# Patient Record
Sex: MALE | Race: AMERICAN INDIAN OR ALASKA NATIVE | URBAN - METROPOLITAN AREA
[De-identification: names, ages, dates, MRNs, and addresses within clinical notes are randomized per-mention and may not be internally consistent; named-entity substitution may affect disease eponyms.]

---

## 2024-11-14 ENCOUNTER — LAB OUTSIDE AN ENCOUNTER (OUTPATIENT)
Dept: URBAN - METROPOLITAN AREA CLINIC 40 | Facility: CLINIC | Age: 29
End: 2024-11-14

## 2024-11-14 ENCOUNTER — DASHBOARD ENCOUNTERS (OUTPATIENT)
Age: 29
End: 2024-11-14

## 2024-11-14 ENCOUNTER — OFFICE VISIT (OUTPATIENT)
Dept: URBAN - METROPOLITAN AREA CLINIC 40 | Facility: CLINIC | Age: 29
End: 2024-11-14
Payer: COMMERCIAL

## 2024-11-14 VITALS
TEMPERATURE: 98.1 F | BODY MASS INDEX: 35.96 KG/M2 | HEART RATE: 67 BPM | SYSTOLIC BLOOD PRESSURE: 132 MMHG | WEIGHT: 251.2 LBS | HEIGHT: 70 IN | DIASTOLIC BLOOD PRESSURE: 88 MMHG

## 2024-11-14 DIAGNOSIS — K76.0 FATTY LIVER: ICD-10-CM

## 2024-11-14 DIAGNOSIS — R79.89 ABNORMAL LIVER FUNCTION TEST: ICD-10-CM

## 2024-11-14 PROCEDURE — 99204 OFFICE O/P NEW MOD 45 MIN: CPT | Performed by: INTERNAL MEDICINE

## 2024-11-14 RX ORDER — ALBUTEROL SULFATE 90 UG/1
1 PUFF AS NEEDED AEROSOL, METERED RESPIRATORY (INHALATION)
Status: ACTIVE | COMMUNITY

## 2024-11-14 RX ORDER — LISINOPRIL 20 MG/1
1 TABLET TABLET ORAL ONCE A DAY
Status: ACTIVE | COMMUNITY

## 2024-11-14 RX ORDER — AMLODIPINE BESYLATE 10 MG/1
1 TABLET TABLET ORAL ONCE A DAY
Status: ACTIVE | COMMUNITY

## 2024-11-14 NOTE — HPI-TODAY'S VISIT:
Patient is being referred for evaluation for abnormal liver tests he does have a history of fatty liver.  He underwent a liver ultrasound in 2016 showing fatty liver and hepatomegaly he underwent follow-up ultrasound 2017 again showing fatty liver lab work at that time revealed   recent lab work from his primary care physician now reveals  AST 59 Patient presents for evaluation 11/14/2024.  He is being referred for evaluation Of his history of fatty liver.  Reviewing previous records he underwent a liver ultrasound in 2016 and again in 2017 showing fatty liver.  Recent lab work does show elevated transaminases with ALT of 100 AST 59.  Overall he is feeling well from a GI standpoint.  He has no significant heartburn or reflux, denies abdominal pain his bowel movements are essentially normal, although he will experience the urge to defecate shortly after eating.  He does drink a good amount of caffeine, especially Coca-[employer]a.  He does not drink alcohol, has had no exposure to hepatitis.  His primary care physician has assessed him as being prediabetic, and he is being treated for hypertension.

## 2024-11-15 LAB
ABSOLUTE BASOPHILS: 71
ABSOLUTE EOSINOPHILS: 128
ABSOLUTE LYMPHOCYTES: 2244
ABSOLUTE MONOCYTES: 625
ABSOLUTE NEUTROPHILS: 4033
ALBUMIN/GLOBULIN RATIO: 1
ALBUMIN: 4
ALKALINE PHOSPHATASE: 91
ALT: 85
AST: 63
BASOPHILS: 1
BILIRUBIN, TOTAL: 0.7
BUN/CREATININE RATIO: (no result)
CALCIUM: 9.1
CARBON DIOXIDE: 25
CHLORIDE: 104
CREATININE: 0.79
EGFR: 124
EOSINOPHILS: 1.8
FIB 4 INDEX: 0.91
FIB 4 INTERPRETATION: (no result)
GLOBULIN: 4.1
GLUCOSE: 91
HBSAG SCREEN: (no result)
HEMATOCRIT: 44.9
HEMOGLOBIN: 15
HEP A AB, IGM: (no result)
HEP B CORE AB, IGM: (no result)
HEPATITIS C ANTIBODY: (no result)
LYMPHOCYTES: 31.6
MCH: 27.6
MCHC: 33.4
MCV: 82.7
MONOCYTES: 8.8
MPV: 10.6
NEUTROPHILS: 56.8
PLATELET COUNT: 211
PLATELET COUNT: 211
POTASSIUM: 4
PROTEIN, TOTAL: 8.1
RDW: 13.8
RED BLOOD CELL COUNT: 5.43
SODIUM: 137
UREA NITROGEN (BUN): 11
WHITE BLOOD CELL COUNT: 7.1

## 2024-12-04 ENCOUNTER — OFFICE VISIT (OUTPATIENT)
Dept: URBAN - METROPOLITAN AREA CLINIC 39 | Facility: CLINIC | Age: 29
End: 2024-12-04
Payer: COMMERCIAL

## 2024-12-04 DIAGNOSIS — K76.0 FATTY (CHANGE OF) LIVER: ICD-10-CM

## 2024-12-04 PROCEDURE — 76705 ECHO EXAM OF ABDOMEN: CPT | Performed by: INTERNAL MEDICINE

## 2025-01-16 ENCOUNTER — OFFICE VISIT (OUTPATIENT)
Dept: URBAN - METROPOLITAN AREA CLINIC 40 | Facility: CLINIC | Age: 30
End: 2025-01-16
Payer: COMMERCIAL

## 2025-01-16 VITALS
TEMPERATURE: 97 F | BODY MASS INDEX: 36.05 KG/M2 | WEIGHT: 251.8 LBS | HEART RATE: 72 BPM | HEIGHT: 70 IN | SYSTOLIC BLOOD PRESSURE: 130 MMHG | DIASTOLIC BLOOD PRESSURE: 80 MMHG

## 2025-01-16 DIAGNOSIS — R79.89 ABNORMAL LIVER FUNCTION TEST: ICD-10-CM

## 2025-01-16 DIAGNOSIS — K76.0 FATTY LIVER: ICD-10-CM

## 2025-01-16 PROCEDURE — 99214 OFFICE O/P EST MOD 30 MIN: CPT | Performed by: INTERNAL MEDICINE

## 2025-01-16 RX ORDER — LISINOPRIL 20 MG/1
1 TABLET TABLET ORAL ONCE A DAY
Status: ACTIVE | COMMUNITY

## 2025-01-16 RX ORDER — AMLODIPINE BESYLATE 10 MG/1
1 TABLET TABLET ORAL ONCE A DAY
Status: ACTIVE | COMMUNITY

## 2025-01-16 RX ORDER — ALBUTEROL SULFATE 90 UG/1
1 PUFF AS NEEDED AEROSOL, METERED RESPIRATORY (INHALATION)
Status: ACTIVE | COMMUNITY

## 2025-01-16 NOTE — HPI-TODAY'S VISIT:
Patient is being referred for evaluation for abnormal liver tests he does have a history of fatty liver.  He underwent a liver ultrasound in 2016 showing fatty liver and hepatomegaly he underwent follow-up ultrasound 2017 again showing fatty liver lab work at that time revealed   recent lab work from his primary care physician now reveals  AST 59 Patient presents for evaluation 11/14/2024.  He is being referred for evaluation Of his history of fatty liver.  Reviewing previous records he underwent a liver ultrasound in 2016 and again in 2017 showing fatty liver.  Recent lab work does show elevated transaminases with ALT of 100 AST 59.  Overall he is feeling well from a GI standpoint.  He has no significant heartburn or reflux, denies abdominal pain his bowel movements are essentially normal, although he will experience the urge to defecate shortly after eating.  He does drink a good amount of caffeine, especially Coca-[employer]a.  He does not drink alcohol, has had no exposure to hepatitis.  His primary care physician has assessed him as being prediabetic, and he is being treated for hypertension. Patient underwent lab work on 11/14/2024 which included a normal CBC and negative hepatitis A, B, and C panel fib 4 index is 0.91 consistent with the absence of advanced liver fibrosis and his CMP with AST 63 ALT 85 alkaline phosphatase 91 bilirubin 0.7 Patient returns for follow-up on 1/16/2025.  I reviewed the results of his recent lab work which included a normal CBC and negative hepatitis A, B, and C panel and the fib 4 index of 0.91 which is consistent with the absence of advanced liver fibrosis.  His CMP did show elevated transaminases with AST 63 ALT 85.  Since his last visit he has not lost weight, which he attributes to the holidays I reviewed the results of his recent right upper quadrant ultrasound which showed fatty liver but was otherwise unremarkable patient states that he is feeling well he has no significant reflux or swallowing issues, denies abdominal pain his bowel movements have been normal.  He has been diagnosed as prediabetic by his primary care doctor.

## 2025-04-17 ENCOUNTER — OFFICE VISIT (OUTPATIENT)
Dept: URBAN - METROPOLITAN AREA CLINIC 40 | Facility: CLINIC | Age: 30
End: 2025-04-17

## 2025-04-17 RX ORDER — AMLODIPINE BESYLATE 10 MG/1
1 TABLET TABLET ORAL ONCE A DAY
Status: ACTIVE | COMMUNITY

## 2025-04-17 RX ORDER — LISINOPRIL 20 MG/1
1 TABLET TABLET ORAL ONCE A DAY
Status: ACTIVE | COMMUNITY

## 2025-04-17 RX ORDER — ALBUTEROL SULFATE 90 UG/1
1 PUFF AS NEEDED AEROSOL, METERED RESPIRATORY (INHALATION)
Status: ACTIVE | COMMUNITY